# Patient Record
Sex: MALE | Race: WHITE | URBAN - METROPOLITAN AREA
[De-identification: names, ages, dates, MRNs, and addresses within clinical notes are randomized per-mention and may not be internally consistent; named-entity substitution may affect disease eponyms.]

---

## 2021-07-15 ENCOUNTER — NEW PATIENT COMPREHENSIVE (OUTPATIENT)
Dept: URBAN - METROPOLITAN AREA CLINIC 50 | Facility: CLINIC | Age: 66
End: 2021-07-15

## 2021-07-15 DIAGNOSIS — Z79.899: ICD-10-CM

## 2021-07-15 PROCEDURE — 92004 COMPRE OPH EXAM NEW PT 1/>: CPT

## 2021-07-15 PROCEDURE — 92134 CPTRZ OPH DX IMG PST SGM RTA: CPT

## 2021-07-15 ASSESSMENT — KERATOMETRY
OD_AXISANGLE_DEGREES: 010
OS_AXISANGLE_DEGREES: 9
OS_K1POWER_DIOPTERS: 43.00
OS_AXISANGLE2_DEGREES: 099
OD_AXISANGLE2_DEGREES: 100
OD_K2POWER_DIOPTERS: 42.00
OD_K1POWER_DIOPTERS: 43.00
OS_K2POWER_DIOPTERS: 42.00

## 2021-07-15 ASSESSMENT — VISUAL ACUITY
OD_GLARE: 20/60
OU_SC: J10@16IN
OS_SC: 20/50-2
OS_GLARE: 20/40
OD_PH: 20/30-1
OD_SC: J16@16IN
OD_SC: 20/60
OS_SC: J10@16IN
OD_GLARE: 20/40
OS_GLARE: 20/30
OU_SC: 20/50
OS_PH: 20/25

## 2021-07-15 ASSESSMENT — TONOMETRY
OD_IOP_MMHG: 12
OS_IOP_MMHG: 13

## 2021-07-15 NOTE — PATIENT DISCUSSION
He is going to be taking Hydroxychloroquin to help prevent Malaria as [place] is going [place] on [place]. No retinal toxicity found on OCT or SLE today.